# Patient Record
Sex: MALE | Race: ASIAN | NOT HISPANIC OR LATINO | URBAN - METROPOLITAN AREA
[De-identification: names, ages, dates, MRNs, and addresses within clinical notes are randomized per-mention and may not be internally consistent; named-entity substitution may affect disease eponyms.]

---

## 2021-04-26 ENCOUNTER — OFFICE (OUTPATIENT)
Dept: URBAN - METROPOLITAN AREA CLINIC 34 | Facility: CLINIC | Age: 81
End: 2021-04-26

## 2021-04-26 VITALS
HEART RATE: 90 BPM | TEMPERATURE: 97.8 F | HEIGHT: 65 IN | WEIGHT: 143 LBS | DIASTOLIC BLOOD PRESSURE: 76 MMHG | SYSTOLIC BLOOD PRESSURE: 138 MMHG

## 2021-04-26 DIAGNOSIS — R19.4 CHANGE IN BOWEL HABIT: ICD-10-CM

## 2021-04-26 DIAGNOSIS — R14.2 ERUCTATION: ICD-10-CM

## 2021-04-26 DIAGNOSIS — N18.9 CHRONIC KIDNEY DISEASE, UNSPECIFIED: ICD-10-CM

## 2021-04-26 DIAGNOSIS — I25.10 ATHEROSCLEROTIC HEART DISEASE OF NATIVE CORONARY ARTERY WITH: ICD-10-CM

## 2021-04-26 DIAGNOSIS — E11.9 TYPE 2 DIABETES MELLITUS WITHOUT COMPLICATIONS: ICD-10-CM

## 2021-04-26 DIAGNOSIS — R12 HEARTBURN: ICD-10-CM

## 2021-04-26 PROCEDURE — 99204 OFFICE O/P NEW MOD 45 MIN: CPT

## 2021-04-26 NOTE — SERVICEHPINOTES
JACLYN RUTHERFORD   is a   80   year old male who is being seen in consultation at the request of   WERO LOZANO   for diarrhea. Pt denies any issues but is here with wife who reports he has loose and explosive stools most days. BMs twice daily, BSS type 4-6. He denies abdominal pain or rectal bleeding. No prior colonoscopy. Lupillo reports belching constantly after eating. He takes omeprazole 20mg which  he feels does not really help. Taking it for awhile. Denies n/v or dysphagia. Denies melena. No prior EGD. No known family hx of colon, stomach, or other GI cancer.  H/o CABG in 1996, following with Dr Romeo q3 months. Appetite is good. Denies weight loss. He does have diabetes and CKD.

## 2022-04-27 ENCOUNTER — NEW REFERRAL (OUTPATIENT)
Dept: URBAN - METROPOLITAN AREA CLINIC 66 | Facility: CLINIC | Age: 82
End: 2022-04-27

## 2022-04-27 DIAGNOSIS — E11.9: ICD-10-CM

## 2022-04-27 DIAGNOSIS — H35.373: ICD-10-CM

## 2022-04-27 DIAGNOSIS — H43.813: ICD-10-CM

## 2022-04-27 DIAGNOSIS — H33.311: ICD-10-CM

## 2022-04-27 PROCEDURE — 99204 OFFICE O/P NEW MOD 45 MIN: CPT | Mod: 25

## 2022-04-27 PROCEDURE — 67145 PROPH RTA DTCHMNT PC: CPT

## 2022-04-27 PROCEDURE — 92201 OPSCPY EXTND RTA DRAW UNI/BI: CPT | Mod: 59

## 2022-04-27 PROCEDURE — 92134 CPTRZ OPH DX IMG PST SGM RTA: CPT

## 2022-04-27 ASSESSMENT — VISUAL ACUITY
OD_SC: 20/20-2
OS_SC: 20/20-2

## 2022-04-27 ASSESSMENT — TONOMETRY
OD_IOP_MMHG: 15
OS_IOP_MMHG: 15

## 2022-06-21 ENCOUNTER — OFFICE (OUTPATIENT)
Dept: URBAN - METROPOLITAN AREA CLINIC 34 | Facility: CLINIC | Age: 82
End: 2022-06-21

## 2022-06-21 VITALS
HEIGHT: 65 IN | SYSTOLIC BLOOD PRESSURE: 125 MMHG | HEART RATE: 138 BPM | DIASTOLIC BLOOD PRESSURE: 64 MMHG | TEMPERATURE: 97.3 F | WEIGHT: 138 LBS

## 2022-06-21 DIAGNOSIS — I25.10 ATHEROSCLEROTIC HEART DISEASE OF NATIVE CORONARY ARTERY WITH: ICD-10-CM

## 2022-06-21 DIAGNOSIS — E11.9 TYPE 2 DIABETES MELLITUS WITHOUT COMPLICATIONS: ICD-10-CM

## 2022-06-21 DIAGNOSIS — R12 HEARTBURN: ICD-10-CM

## 2022-06-21 DIAGNOSIS — R19.4 CHANGE IN BOWEL HABIT: ICD-10-CM

## 2022-06-21 DIAGNOSIS — N18.9 CHRONIC KIDNEY DISEASE, UNSPECIFIED: ICD-10-CM

## 2022-06-21 DIAGNOSIS — R14.2 ERUCTATION: ICD-10-CM

## 2022-06-21 DIAGNOSIS — R93.3 ABNORMAL FINDINGS ON DIAGNOSTIC IMAGING OF OTHER PARTS OF DI: ICD-10-CM

## 2022-06-21 PROCEDURE — 99214 OFFICE O/P EST MOD 30 MIN: CPT

## 2022-06-21 NOTE — SERVICEHPINOTES
JACLYN RUTHERFORD   is a   81   male who presents for follow up regarding GERD, belching, and gas/loose stools.
brWas seen here last year for same symptoms and was started on PPI and barium swallow was obtained which showed moderate GILLES, prominent esophageal mucosal folds/suspected esophagitis. EGD and colonoscopy was recommended at that time but he did not want procedures then due to covid concerns. Is now ready to get further evaluation. 
br Belching is most bothersome symptom. Takes omeprazole sometimes which helps with the belching. No dysphagia. No abd pain. BMs are occasionally loose. Once daily and regular. Acid reflux occasionally at night, a couple times per night. States cardiac evaluation has been good but often feels fatigued and SOB. Following with Dr Romeo q6 months, next follow up is within the next month. 
br No prior colonoscopy or EGD.

## 2022-06-24 ENCOUNTER — FOLLOW UP (OUTPATIENT)
Dept: URBAN - METROPOLITAN AREA CLINIC 66 | Facility: CLINIC | Age: 82
End: 2022-06-24

## 2022-06-24 DIAGNOSIS — H43.813: ICD-10-CM

## 2022-06-24 DIAGNOSIS — H35.373: ICD-10-CM

## 2022-06-24 DIAGNOSIS — H33.311: ICD-10-CM

## 2022-06-24 DIAGNOSIS — E11.9: ICD-10-CM

## 2022-06-24 PROCEDURE — 92201 OPSCPY EXTND RTA DRAW UNI/BI: CPT

## 2022-06-24 PROCEDURE — 92014 COMPRE OPH EXAM EST PT 1/>: CPT

## 2022-06-24 PROCEDURE — 92134 CPTRZ OPH DX IMG PST SGM RTA: CPT

## 2022-06-24 ASSESSMENT — VISUAL ACUITY: OD_SC: 20/20-2

## 2022-06-24 ASSESSMENT — TONOMETRY: OD_IOP_MMHG: 18

## 2022-08-04 ENCOUNTER — AMBULATORY SURGICAL CENTER (OUTPATIENT)
Dept: URBAN - METROPOLITAN AREA SURGERY 23 | Facility: SURGERY | Age: 82
End: 2022-08-04
Payer: COMMERCIAL

## 2022-08-04 DIAGNOSIS — K31.89 OTHER DISEASES OF STOMACH AND DUODENUM: ICD-10-CM

## 2022-08-04 DIAGNOSIS — K21.9 GASTRO-ESOPHAGEAL REFLUX DISEASE WITHOUT ESOPHAGITIS: ICD-10-CM

## 2022-08-04 DIAGNOSIS — R19.4 CHANGE IN BOWEL HABIT: ICD-10-CM

## 2022-08-04 PROCEDURE — 45378 DIAGNOSTIC COLONOSCOPY: CPT | Performed by: INTERNAL MEDICINE

## 2022-08-04 PROCEDURE — 43239 EGD BIOPSY SINGLE/MULTIPLE: CPT | Performed by: INTERNAL MEDICINE

## 2023-01-18 ENCOUNTER — FOLLOW UP (OUTPATIENT)
Dept: URBAN - METROPOLITAN AREA CLINIC 66 | Facility: CLINIC | Age: 83
End: 2023-01-18

## 2023-01-18 DIAGNOSIS — E11.9: ICD-10-CM

## 2023-01-18 DIAGNOSIS — H43.813: ICD-10-CM

## 2023-01-18 DIAGNOSIS — H04.123: ICD-10-CM

## 2023-01-18 DIAGNOSIS — H33.311: ICD-10-CM

## 2023-01-18 DIAGNOSIS — H35.373: ICD-10-CM

## 2023-01-18 PROCEDURE — 92201 OPSCPY EXTND RTA DRAW UNI/BI: CPT

## 2023-01-18 PROCEDURE — 92014 COMPRE OPH EXAM EST PT 1/>: CPT

## 2023-01-18 PROCEDURE — 92134 CPTRZ OPH DX IMG PST SGM RTA: CPT

## 2023-01-18 ASSESSMENT — TONOMETRY
OD_IOP_MMHG: 21
OS_IOP_MMHG: 21

## 2023-01-18 ASSESSMENT — VISUAL ACUITY
OS_SC: 20/20-1
OD_SC: 20/20-1

## 2023-07-12 ENCOUNTER — FOLLOW UP (OUTPATIENT)
Dept: URBAN - METROPOLITAN AREA CLINIC 66 | Facility: CLINIC | Age: 83
End: 2023-07-12

## 2023-07-12 DIAGNOSIS — H35.373: ICD-10-CM

## 2023-07-12 DIAGNOSIS — E11.9: ICD-10-CM

## 2023-07-12 DIAGNOSIS — H33.311: ICD-10-CM

## 2023-07-12 DIAGNOSIS — H43.813: ICD-10-CM

## 2023-07-12 DIAGNOSIS — H04.123: ICD-10-CM

## 2023-07-12 PROCEDURE — 92014 COMPRE OPH EXAM EST PT 1/>: CPT

## 2023-07-12 PROCEDURE — 92201 OPSCPY EXTND RTA DRAW UNI/BI: CPT

## 2023-07-12 PROCEDURE — 92134 CPTRZ OPH DX IMG PST SGM RTA: CPT

## 2023-07-12 ASSESSMENT — VISUAL ACUITY
OD_SC: 20/20-2
OS_SC: 20/25+2

## 2023-07-12 ASSESSMENT — TONOMETRY
OD_IOP_MMHG: 18
OS_IOP_MMHG: 16

## 2024-09-18 ENCOUNTER — FOLLOW UP (OUTPATIENT)
Dept: URBAN - METROPOLITAN AREA CLINIC 66 | Facility: CLINIC | Age: 84
End: 2024-09-18

## 2024-09-18 DIAGNOSIS — H04.123: ICD-10-CM

## 2024-09-18 DIAGNOSIS — H33.311: ICD-10-CM

## 2024-09-18 DIAGNOSIS — H35.373: ICD-10-CM

## 2024-09-18 DIAGNOSIS — E11.9: ICD-10-CM

## 2024-09-18 DIAGNOSIS — H43.813: ICD-10-CM

## 2024-09-18 PROCEDURE — 92202 OPSCPY EXTND ON/MAC DRAW: CPT

## 2024-09-18 PROCEDURE — 92134 CPTRZ OPH DX IMG PST SGM RTA: CPT

## 2024-09-18 PROCEDURE — 92014 COMPRE OPH EXAM EST PT 1/>: CPT

## 2024-09-18 ASSESSMENT — VISUAL ACUITY
OS_SC: 20/20
OD_SC: 20/25

## 2024-09-18 ASSESSMENT — TONOMETRY
OS_IOP_MMHG: 14
OD_IOP_MMHG: 17